# Patient Record
Sex: FEMALE | Race: BLACK OR AFRICAN AMERICAN | URBAN - METROPOLITAN AREA
[De-identification: names, ages, dates, MRNs, and addresses within clinical notes are randomized per-mention and may not be internally consistent; named-entity substitution may affect disease eponyms.]

---

## 2023-10-03 ENCOUNTER — HOSPITAL ENCOUNTER (OUTPATIENT)
Dept: TELEMEDICINE | Facility: HOSPITAL | Age: 68
Discharge: HOME | End: 2023-10-03

## 2023-10-03 DIAGNOSIS — R29.818 TRANSIENT NEUROLOGICAL SYMPTOMS: ICD-10-CM

## 2023-10-03 PROCEDURE — G0426 PR INPT TELEHEALTH CONSULT 50M: ICD-10-PCS | Mod: GT,,, | Performed by: PSYCHIATRY & NEUROLOGY

## 2023-10-03 PROCEDURE — G0426 INPT/ED TELECONSULT50: HCPCS | Mod: GT,,, | Performed by: PSYCHIATRY & NEUROLOGY

## 2023-10-03 NOTE — HPI
68F found at a [employer] parking lot. LSN [id number]. She was confused but verablizing and following directions and ambulating independently. Came to the ED but then suddenly became non-responsive/nonverbal but awake x about 1 minute. Now she is interacting and confused as she hsa returned from CT scan.  Has had to 2 prior episodes involving memory.  No current outpatient medications  No past medical history on file.

## 2023-10-03 NOTE — ASSESSMENT & PLAN NOTE
Isolated confusion/aphasia that is recurring. Episodes seem to begin with transient staring spell and no verbal output for about a minute followed by confused speech that seems to improve and slowly recover but does not get back to baseline. On exam she is aphasic , able to read but with a few errors, 50% on identification of objects, difficulty with conversational speech.  Imaging personally interpreted:  CT Brain: no evidence of early or subacute ischemic changes, intracerebral hemorrhage or hyperdense vessel signs  CTA Head & Neck: Not performed at time of consultation    DDx includes seizure w/ postictal state vs. Ischemic stroke w/ waxing and waning perfusion. Overall suspicion is higher for seizure with postictal aphasia.  However , she is in the window for lytic therapy. Discussed in detail with ED staff, current plan is for stat MRI / MRA w/o contrast to evaluate / r/o LVO/mVO and/or infarct. I have been assured this can get done STAT and she is being wheeled to MRI at this time. Have asked spoke to push images immediately w/o delay and to not wait for radiology read and to call me back as soon as she returns to room for re-evaluation. If confirmed ischemic origin, would favor lytic therapy if symptomatic. Will follow up results of imaging once performed.

## 2023-10-03 NOTE — CONSULTS
[facility]  Vascular Neurology  Comprehensive Stroke Center  TeleVascular Neurology Acute Consultation Note      Consults    Consulting Provider: CYNTHIA STEPHENS  Current Providers  No providers found    Patient Location: [facility] Emergency Department  Spoke hospital nurse at bedside with patient assisting consultant.     Patient information was obtained from patient, relative(s), and primary team.         Assessment/Plan:       Diagnoses:   Neuro  Transient neurological symptoms  Isolated confusion/aphasia that is recurring. Episodes seem to begin with transient staring spell and no verbal output for about a minute followed by confused speech that seems to improve and slowly recover but does not get back to baseline. On exam she is aphasic , able to read but with a few errors, 50% on identification of objects, difficulty with conversational speech.  Imaging personally interpreted:  CT Brain: no evidence of early or subacute ischemic changes, intracerebral hemorrhage or hyperdense vessel signs  CTA Head & Neck: Not performed at time of consultation    DDx includes seizure w/ postictal state vs. Ischemic stroke w/ waxing and waning perfusion. Overall suspicion is higher for seizure with postictal aphasia.  However , she is in the window for lytic therapy. Discussed in detail with ED staff, current plan is for stat MRI / MRA w/o contrast to evaluate / r/o LVO/mVO and/or infarct. I have been assured this can get done STAT and she is being wheeled to MRI at this time. Have asked spoke to push images immediately w/o delay and to not wait for radiology read and to call me back as soon as she returns to room for re-evaluation. If confirmed ischemic origin, would favor lytic therapy if symptomatic. Will follow up results of imaging once performed.        STROKE DOCUMENTATION     Acute Stroke Times:   Acute Stroke Times   Last Known  Normal Time: 1030  Stroke Team Called Time: 1222  Stroke Team Arrival Time: 1224  CT Interpretation Time: 1229    NIH Scale:  1a. Level of Consciousness: 0-->Alert, keenly responsive  1b. LOC Questions: 2-->Answers neither question correctly  1c. LOC Commands: 1-->Performs one task correctly  2. Best Gaze: 0-->Normal  3. Visual: 0-->No visual loss  4. Facial Palsy: 0-->Normal symmetrical movements  5a. Motor Arm, Left: 0-->No drift, limb holds 90 (or 45) degrees for full 10 secs  5b. Motor Arm, Right: 0-->No drift, limb holds 90 (or 45) degrees for full 10 secs  6a. Motor Leg, Left: 0-->No drift, leg holds 30 degree position for full 5 secs  6b. Motor Leg, Right: 0-->No drift, leg holds 30 degree position for full 5 secs  7. Limb Ataxia: 0-->Absent  8. Sensory: 0-->Normal, no sensory loss  9. Best Language: 2-->Severe aphasia, all communication is through fragmentary expression, great need for inference, questioning, and guessing by the listener. Range of information that can be exchanged is limited, listener carries burden of. . . (see row details)  10. Dysarthria: 0-->Normal  11. Extinction and Inattention (formerly Neglect): 0-->No abnormality  Total (NIH Stroke Scale): 5     Modified [place]    Taylor Coma Scale:    ABCD2 Score:    [id number] Score:   HAS -BLED Score:   ICH Score:   Hunt & Garcia Classification:       There were no vitals taken for this visit.  Eligible for thrombolytic therapy?: Yes  Thrombolytic therapy recomended: Thrombolytic therapy not recommended due to Suspected stroke mimic   Possible Interventional Revascularization Candidate?  Awaiting MRI/MRA    Disposition Recommendation: pending further studies    Subjective:     History of Present Illness:  68F found at [place] [employer] parking lot. LSN [id number]. She was confused but verablizing and following directions and ambulating independently. Came to the ED but then suddenly became non-responsive/nonverbal but awake x about 1 minute. Now she is  interacting and confused as she hsa returned from CT scan.  Has had to 2 prior episodes involving memory.  No current outpatient medications  No past medical history on file.      No new subjective & objective note has been filed under this hospital service since the last note was generated.      Recommended the emergency room physician to have a brief discussion with the patient and/or family if available regarding the  risks and benefits of treatment, and to briefly document the occurrence of that discussion in his clinical encounter note.     The attending portion of this evaluation, treatment, and documentation was performed per Alessandro Urban MD via audiovisual.    Billing code:  (non-intervention mild to moderate stroke, TIA, some mimics)      This patient has a critical neurological condition/illness, with some potential for high morbidity and mortality.  There is a moderate probability for acute neurological change leading to clinical and possibly life-threatening deterioration requiring highest level of physician preparedness for urgent intervention.  Care was coordinated with other physicians involved in the patient's care.  Radiologic studies and laboratory data were reviewed and interpreted, and plan of care was re-assessed based on the results.  Diagnosis, treatment options and prognosis may have been discussed with the patient and/or family members or caregiver.    In your opinion, this was a: Tier 1 Van Negative    Consult End Time: 12:48 PM     Alessandro Urban MD  [facility]  Vascular Neurology   [facility]

## 2023-10-03 NOTE — CARE UPDATE
Discussed results of MRI - negative for ischemia per read. Still awaiting pushing of images to me for reading.  At this time will manage as seizures.  Keppra 30 mg /kg load.  EEG